# Patient Record
Sex: FEMALE | Race: WHITE
[De-identification: names, ages, dates, MRNs, and addresses within clinical notes are randomized per-mention and may not be internally consistent; named-entity substitution may affect disease eponyms.]

---

## 2018-09-28 ENCOUNTER — HOSPITAL ENCOUNTER (EMERGENCY)
Dept: HOSPITAL 92 - ERS | Age: 63
Discharge: HOME | End: 2018-09-28
Payer: COMMERCIAL

## 2018-09-28 DIAGNOSIS — E78.5: ICD-10-CM

## 2018-09-28 DIAGNOSIS — N13.2: Primary | ICD-10-CM

## 2018-09-28 DIAGNOSIS — Z79.899: ICD-10-CM

## 2018-09-28 LAB
ALBUMIN SERPL BCG-MCNC: 5 G/DL (ref 3.4–4.8)
ALP SERPL-CCNC: 70 U/L (ref 40–150)
ALT SERPL W P-5'-P-CCNC: 16 U/L (ref 8–55)
ANION GAP SERPL CALC-SCNC: 19 MMOL/L (ref 10–20)
AST SERPL-CCNC: 23 U/L (ref 5–34)
BASOPHILS # BLD AUTO: 0 THOU/UL (ref 0–0.2)
BASOPHILS NFR BLD AUTO: 0.3 % (ref 0–1)
BILIRUB SERPL-MCNC: 0.6 MG/DL (ref 0.2–1.2)
BUN SERPL-MCNC: 18 MG/DL (ref 9.8–20.1)
CALCIUM SERPL-MCNC: 10.9 MG/DL (ref 7.8–10.44)
CHLORIDE SERPL-SCNC: 101 MMOL/L (ref 98–107)
CK MB SERPL-MCNC: 1.4 NG/ML (ref 0–6.6)
CO2 SERPL-SCNC: 24 MMOL/L (ref 23–31)
CREAT CL PREDICTED SERPL C-G-VRATE: 0 ML/MIN (ref 70–130)
CRYSTAL-AUWI FLAG: 1.3 (ref 0–15)
EOSINOPHIL # BLD AUTO: 0 THOU/UL (ref 0–0.7)
EOSINOPHIL NFR BLD AUTO: 0.2 % (ref 0–10)
GLOBULIN SER CALC-MCNC: 3 G/DL (ref 2.4–3.5)
GLUCOSE SERPL-MCNC: 131 MG/DL (ref 80–115)
HEV IGM SER QL: 6.3 (ref 0–7.99)
HGB BLD-MCNC: 14.8 G/DL (ref 12–16)
HYALINE CASTS #/AREA URNS LPF: (no result) LPF
LYMPHOCYTES # BLD: 1.1 THOU/UL (ref 1.2–3.4)
LYMPHOCYTES NFR BLD AUTO: 7.3 % (ref 21–51)
MCH RBC QN AUTO: 31.2 PG (ref 27–31)
MCV RBC AUTO: 93.8 FL (ref 78–98)
MONOCYTES # BLD AUTO: 0.7 THOU/UL (ref 0.11–0.59)
MONOCYTES NFR BLD AUTO: 4.5 % (ref 0–10)
NEUTROPHILS # BLD AUTO: 13.4 THOU/UL (ref 1.4–6.5)
NEUTROPHILS NFR BLD AUTO: 87.7 % (ref 42–75)
PATHC CAST-AUWI FLAG: 0.72 (ref 0–2.49)
PLATELET # BLD AUTO: 279 THOU/UL (ref 130–400)
POTASSIUM SERPL-SCNC: 3.7 MMOL/L (ref 3.5–5.1)
RBC # BLD AUTO: 4.75 MILL/UL (ref 4.2–5.4)
RBC UR QL AUTO: (no result) HPF (ref 0–3)
SODIUM SERPL-SCNC: 140 MMOL/L (ref 136–145)
SP GR UR STRIP: 1.01 (ref 1–1.04)
SPERM-AUWI FLAG: 0 (ref 0–9.9)
TROPONIN I SERPL DL<=0.01 NG/ML-MCNC: (no result) NG/ML (ref ?–0.03)
WBC # BLD AUTO: 15.3 THOU/UL (ref 4.8–10.8)
WBC UR QL AUTO: (no result) HPF (ref 0–3)
YEAST-AUWI FLAG: 0 (ref 0–25)

## 2018-09-28 PROCEDURE — 96374 THER/PROPH/DIAG INJ IV PUSH: CPT

## 2018-09-28 PROCEDURE — 80053 COMPREHEN METABOLIC PANEL: CPT

## 2018-09-28 PROCEDURE — 81003 URINALYSIS AUTO W/O SCOPE: CPT

## 2018-09-28 PROCEDURE — 85025 COMPLETE CBC W/AUTO DIFF WBC: CPT

## 2018-09-28 PROCEDURE — 81015 MICROSCOPIC EXAM OF URINE: CPT

## 2018-09-28 PROCEDURE — 74176 CT ABD & PELVIS W/O CONTRAST: CPT

## 2018-09-28 PROCEDURE — 83690 ASSAY OF LIPASE: CPT

## 2018-09-28 PROCEDURE — 96361 HYDRATE IV INFUSION ADD-ON: CPT

## 2018-09-28 PROCEDURE — 82553 CREATINE MB FRACTION: CPT

## 2018-09-28 PROCEDURE — 96375 TX/PRO/DX INJ NEW DRUG ADDON: CPT

## 2018-09-28 PROCEDURE — 96376 TX/PRO/DX INJ SAME DRUG ADON: CPT

## 2018-09-28 PROCEDURE — 87086 URINE CULTURE/COLONY COUNT: CPT

## 2018-09-28 PROCEDURE — 84484 ASSAY OF TROPONIN QUANT: CPT

## 2018-09-28 NOTE — CT
NONCONTRAST CT ABDOMEN AND PELVIS 

9/28/18

 

HISTORY: 

Left lower quadrant pain radiating to back. History of kidney stones and chronic constipation. 

 

COMPARISON:   

None available. 

 

FINDINGS:  

There is a large staghorn type renal calculus involving the left kidney with large calculus in the le
ft renal pelvis which grossly measures 3.3 cm x 3.1 cm in maximal axial dimensions. Several additiona
l smaller calculi are also seen with multiple calculi which coalesce to form a larger calculus in the
 inferior pole left kidney which measures 1.9 cm x 1.8 cm. There is mild hydronephrosis involving the
 superior pole of the left kidney. There is perinephric stranding seen on the left with small amount 
of fluid seen anterior to the left kidney. No left ureteral calculus is appreciated. Calcifications a
re seen along the course of the left ureter thought to be related to gonadal vein calcifications as o
pposed to ureteral calculi. There is a prominent calculus within the right renal pelvis measuring 2 c
m x 1.1 cm with additional smaller inferior pole right renal calculi. There is mild caliectasis invol
ving the superior pole right renal calices. There is mild dilatation of the proximal right ureter, wi
th transition near the level of the sacrum, but no ureteral calculus is appreciated. The exact etiolo
gy for the mild dilatation right ureter is uncertain. 

 

There is an approximately 1.9 cm hypodense lesion seen within the inferior aspect posterior segment o
f the right hepatic lobe which may represent a small right hepatic lobe cyst. 

 

There is evidence of mild bibasilar atelectasis. 

 

Lack of intravenous contrast limits sensitivity for evaluation of the parenchymal organs; however, th
e spleen, pancreas, bilateral adrenal glands, and urinary bladder demonstrate a grossly normal nonenh
anced CT appearance. 

 

There is a lobulated appearance of the fundus of the uterus with suggestion of an exophytic isodense 
mass at the superior left lateral aspect of the fundus of the uterus measuring 2.8 cm likely related 
to a uterine fibroid. There is adjacent small calcification present. 

 

The appendix is visualized and normal in caliber. 

 

There are mild degenerative changes seen in the lower lumbar spine. 

 

IMPRESSION:  

1.      Obstructing large staghorn calculus in the left renal pelvis with dilatation of the  left azucena
al pelvis due to the large calculus and resultant mild hydronephrosis of the superior pole left renal
 collecting system. There are several additional calculi seen within the inferior pole left renal col
lecting system with larger calculus which may represent coalescence of multiple smaller calculi seen 
within the inferior pole calyx. There is associated perinephric stranding seen with small amount of f
luid. Infection on the left cannot be entirely excluded based on this examination. 

2.      Nonobstructing right renal calculi with prominent calculus in the right renal pelvis which do
es result in mild caliectasis without overt hydronephrosis. 

3.      Hypodense lesion right hepatic lobe probably related to hepatic cyst.

4.      Small hiatal hernia.

5.      No CT evidence of appendicitis. 

 

POS: ADELINE